# Patient Record
Sex: MALE | Race: WHITE | NOT HISPANIC OR LATINO
[De-identification: names, ages, dates, MRNs, and addresses within clinical notes are randomized per-mention and may not be internally consistent; named-entity substitution may affect disease eponyms.]

---

## 2017-02-21 PROBLEM — Z00.00 ENCOUNTER FOR PREVENTIVE HEALTH EXAMINATION: Status: ACTIVE | Noted: 2017-02-21

## 2017-03-13 ENCOUNTER — APPOINTMENT (OUTPATIENT)
Dept: ORTHOPEDIC SURGERY | Facility: CLINIC | Age: 57
End: 2017-03-13

## 2017-03-13 VITALS — BODY MASS INDEX: 35.76 KG/M2 | HEIGHT: 72 IN | WEIGHT: 264 LBS

## 2017-03-13 VITALS — HEART RATE: 69 BPM | DIASTOLIC BLOOD PRESSURE: 85 MMHG | SYSTOLIC BLOOD PRESSURE: 128 MMHG

## 2017-03-13 DIAGNOSIS — Z87.39 PERSONAL HISTORY OF OTHER DISEASES OF THE MUSCULOSKELETAL SYSTEM AND CONNECTIVE TISSUE: ICD-10-CM

## 2017-03-13 DIAGNOSIS — Z80.9 FAMILY HISTORY OF MALIGNANT NEOPLASM, UNSPECIFIED: ICD-10-CM

## 2017-03-13 DIAGNOSIS — Z78.9 OTHER SPECIFIED HEALTH STATUS: ICD-10-CM

## 2017-03-13 RX ORDER — ASCORBIC ACID 500 MG
500-400 TABLET,CHEWABLE ORAL
Refills: 0 | Status: ACTIVE | COMMUNITY

## 2017-03-13 RX ORDER — PSYLLIUM HUSK 0.4 G
CAPSULE ORAL
Refills: 0 | Status: ACTIVE | COMMUNITY

## 2017-03-14 ENCOUNTER — TRANSCRIPTION ENCOUNTER (OUTPATIENT)
Age: 57
End: 2017-03-14

## 2017-04-07 ENCOUNTER — OUTPATIENT (OUTPATIENT)
Dept: OUTPATIENT SERVICES | Facility: HOSPITAL | Age: 57
LOS: 1 days | End: 2017-04-07
Payer: COMMERCIAL

## 2017-04-07 VITALS
RESPIRATION RATE: 14 BRPM | HEART RATE: 75 BPM | HEIGHT: 72 IN | SYSTOLIC BLOOD PRESSURE: 144 MMHG | TEMPERATURE: 98 F | WEIGHT: 268.96 LBS | OXYGEN SATURATION: 96 % | DIASTOLIC BLOOD PRESSURE: 89 MMHG

## 2017-04-07 DIAGNOSIS — Z98.890 OTHER SPECIFIED POSTPROCEDURAL STATES: Chronic | ICD-10-CM

## 2017-04-07 DIAGNOSIS — Z01.818 ENCOUNTER FOR OTHER PREPROCEDURAL EXAMINATION: ICD-10-CM

## 2017-04-07 DIAGNOSIS — G47.33 OBSTRUCTIVE SLEEP APNEA (ADULT) (PEDIATRIC): ICD-10-CM

## 2017-04-07 DIAGNOSIS — M48.02 SPINAL STENOSIS, CERVICAL REGION: ICD-10-CM

## 2017-04-07 LAB
ANION GAP SERPL CALC-SCNC: 17 MMOL/L — SIGNIFICANT CHANGE UP (ref 5–17)
BLD GP AB SCN SERPL QL: NEGATIVE — SIGNIFICANT CHANGE UP
BUN SERPL-MCNC: 13 MG/DL — SIGNIFICANT CHANGE UP (ref 7–23)
CALCIUM SERPL-MCNC: 9.7 MG/DL — SIGNIFICANT CHANGE UP (ref 8.4–10.5)
CHLORIDE SERPL-SCNC: 100 MMOL/L — SIGNIFICANT CHANGE UP (ref 96–108)
CO2 SERPL-SCNC: 24 MMOL/L — SIGNIFICANT CHANGE UP (ref 22–31)
CREAT SERPL-MCNC: 1.05 MG/DL — SIGNIFICANT CHANGE UP (ref 0.5–1.3)
GLUCOSE SERPL-MCNC: 91 MG/DL — SIGNIFICANT CHANGE UP (ref 70–99)
HCT VFR BLD CALC: 45 % — SIGNIFICANT CHANGE UP (ref 39–50)
HGB BLD-MCNC: 15.2 G/DL — SIGNIFICANT CHANGE UP (ref 13–17)
MCHC RBC-ENTMCNC: 30.1 PG — SIGNIFICANT CHANGE UP (ref 27–34)
MCHC RBC-ENTMCNC: 33.8 GM/DL — SIGNIFICANT CHANGE UP (ref 32–36)
MCV RBC AUTO: 89.1 FL — SIGNIFICANT CHANGE UP (ref 80–100)
PLATELET # BLD AUTO: 206 K/UL — SIGNIFICANT CHANGE UP (ref 150–400)
POTASSIUM SERPL-MCNC: 4.2 MMOL/L — SIGNIFICANT CHANGE UP (ref 3.5–5.3)
POTASSIUM SERPL-SCNC: 4.2 MMOL/L — SIGNIFICANT CHANGE UP (ref 3.5–5.3)
RBC # BLD: 5.05 M/UL — SIGNIFICANT CHANGE UP (ref 4.2–5.8)
RBC # FLD: 12.9 % — SIGNIFICANT CHANGE UP (ref 10.3–14.5)
RH IG SCN BLD-IMP: POSITIVE — SIGNIFICANT CHANGE UP
SODIUM SERPL-SCNC: 141 MMOL/L — SIGNIFICANT CHANGE UP (ref 135–145)
WBC # BLD: 7.89 K/UL — SIGNIFICANT CHANGE UP (ref 3.8–10.5)
WBC # FLD AUTO: 7.89 K/UL — SIGNIFICANT CHANGE UP (ref 3.8–10.5)

## 2017-04-07 PROCEDURE — G0463: CPT

## 2017-04-07 PROCEDURE — 80048 BASIC METABOLIC PNL TOTAL CA: CPT

## 2017-04-07 PROCEDURE — 86900 BLOOD TYPING SEROLOGIC ABO: CPT

## 2017-04-07 PROCEDURE — 86901 BLOOD TYPING SEROLOGIC RH(D): CPT

## 2017-04-07 PROCEDURE — 86850 RBC ANTIBODY SCREEN: CPT

## 2017-04-07 PROCEDURE — 85027 COMPLETE CBC AUTOMATED: CPT

## 2017-04-07 RX ORDER — CEFAZOLIN SODIUM 1 G
2000 VIAL (EA) INJECTION ONCE
Qty: 0 | Refills: 0 | Status: DISCONTINUED | OUTPATIENT
Start: 2017-04-14 | End: 2017-04-15

## 2017-04-07 NOTE — H&P PST ADULT - ATTENDING COMMENTS
55 yo male with cervical HNP, spinal cord compression, failed conservative management and care, recommend ACDF C5-C7. I reviewed all major risks, benefits, and complications including bleeding, infection, neurological injury, spinal cord injury, implant failure, implant migration, pseudarthrosis, adjacent segment degeneration, dysphagia, dysphonia, horners syndrome, medical complications, and risk of mortality.

## 2017-04-07 NOTE — H&P PST ADULT - HISTORY OF PRESENT ILLNESS
57 yo male with h/o cervical spinal stenosis and cervical disc herniation with c/o worsening neck and bilateral arm pain (right worse than left), with intermittent paresthesias and loss of sensation in arms/hands. Pt is scheduled for C5-7 ACDF on 4/14/17.

## 2017-04-07 NOTE — H&P PST ADULT - PMH
Arthritis    Cervical herniated disc    Cluster headache    Spinal stenosis of cervical region Arthritis    Cervical herniated disc    Cluster headache    Obesity (BMI 30-39.9)    Spinal stenosis of cervical region

## 2017-04-07 NOTE — H&P PST ADULT - RS GEN PE MLT RESP DETAILS PC
none
clear to auscultation bilaterally/respirations non-labored/good air movement/breath sounds equal

## 2017-04-07 NOTE — H&P PST ADULT - NSANTHOSAYNRD_GEN_A_CORE
No. NIKA screening performed.  STOP BANG Legend: 0-2 = LOW Risk; 3-4 = INTERMEDIATE Risk; 5-8 = HIGH Risk No. NIKA screening performed.  STOP BANG Legend: 0-2 = LOW Risk; 3-4 = INTERMEDIATE Risk; 5-8 = HIGH Risk/neck = 15 inches

## 2017-04-07 NOTE — H&P PST ADULT - PSH
H/O cardiac catheterization  2006 - for c/o chest pain - no intervention  Status post debridement H/O cardiac catheterization  2006 - for c/o chest pain - no intervention  Status post debridement  right and left shoulders

## 2017-04-10 ENCOUNTER — RX RENEWAL (OUTPATIENT)
Age: 57
End: 2017-04-10

## 2017-04-13 ENCOUNTER — RESULT REVIEW (OUTPATIENT)
Age: 57
End: 2017-04-13

## 2017-04-14 ENCOUNTER — APPOINTMENT (OUTPATIENT)
Dept: ORTHOPEDIC SURGERY | Facility: HOSPITAL | Age: 57
End: 2017-04-14

## 2017-04-14 ENCOUNTER — INPATIENT (INPATIENT)
Facility: HOSPITAL | Age: 57
LOS: 0 days | Discharge: HOME | DRG: 473 | End: 2017-04-15
Attending: ORTHOPAEDIC SURGERY | Admitting: ORTHOPAEDIC SURGERY
Payer: COMMERCIAL

## 2017-04-14 ENCOUNTER — TRANSCRIPTION ENCOUNTER (OUTPATIENT)
Age: 57
End: 2017-04-14

## 2017-04-14 VITALS
OXYGEN SATURATION: 97 % | WEIGHT: 261.91 LBS | SYSTOLIC BLOOD PRESSURE: 144 MMHG | DIASTOLIC BLOOD PRESSURE: 90 MMHG | HEIGHT: 72 IN | RESPIRATION RATE: 18 BRPM | TEMPERATURE: 99 F | HEART RATE: 77 BPM

## 2017-04-14 DIAGNOSIS — Z98.890 OTHER SPECIFIED POSTPROCEDURAL STATES: Chronic | ICD-10-CM

## 2017-04-14 DIAGNOSIS — M48.02 SPINAL STENOSIS, CERVICAL REGION: ICD-10-CM

## 2017-04-14 LAB
ANION GAP SERPL CALC-SCNC: 12 MMOL/L — SIGNIFICANT CHANGE UP (ref 5–17)
BUN SERPL-MCNC: 16 MG/DL — SIGNIFICANT CHANGE UP (ref 7–23)
CALCIUM SERPL-MCNC: 8.6 MG/DL — SIGNIFICANT CHANGE UP (ref 8.4–10.5)
CHLORIDE SERPL-SCNC: 103 MMOL/L — SIGNIFICANT CHANGE UP (ref 96–108)
CO2 SERPL-SCNC: 23 MMOL/L — SIGNIFICANT CHANGE UP (ref 22–31)
CREAT SERPL-MCNC: 0.83 MG/DL — SIGNIFICANT CHANGE UP (ref 0.5–1.3)
GLUCOSE SERPL-MCNC: 164 MG/DL — HIGH (ref 70–99)
HCT VFR BLD CALC: 44 % — SIGNIFICANT CHANGE UP (ref 39–50)
HGB BLD-MCNC: 14.8 G/DL — SIGNIFICANT CHANGE UP (ref 13–17)
MCHC RBC-ENTMCNC: 30.7 PG — SIGNIFICANT CHANGE UP (ref 27–34)
MCHC RBC-ENTMCNC: 33.7 GM/DL — SIGNIFICANT CHANGE UP (ref 32–36)
MCV RBC AUTO: 90.9 FL — SIGNIFICANT CHANGE UP (ref 80–100)
PLATELET # BLD AUTO: 160 K/UL — SIGNIFICANT CHANGE UP (ref 150–400)
POTASSIUM SERPL-MCNC: 4 MMOL/L — SIGNIFICANT CHANGE UP (ref 3.5–5.3)
POTASSIUM SERPL-SCNC: 4 MMOL/L — SIGNIFICANT CHANGE UP (ref 3.5–5.3)
RBC # BLD: 4.84 M/UL — SIGNIFICANT CHANGE UP (ref 4.2–5.8)
RBC # FLD: 11.6 % — SIGNIFICANT CHANGE UP (ref 10.3–14.5)
RH IG SCN BLD-IMP: POSITIVE — SIGNIFICANT CHANGE UP
SODIUM SERPL-SCNC: 138 MMOL/L — SIGNIFICANT CHANGE UP (ref 135–145)
WBC # BLD: 9.5 K/UL — SIGNIFICANT CHANGE UP (ref 3.8–10.5)
WBC # FLD AUTO: 9.5 K/UL — SIGNIFICANT CHANGE UP (ref 3.8–10.5)

## 2017-04-14 PROCEDURE — 22552 ARTHRD ANT NTRBD CERVICAL EA: CPT

## 2017-04-14 PROCEDURE — 22859 INSJ BIOMECHANICAL DEVICE: CPT | Mod: 59

## 2017-04-14 PROCEDURE — 22845 INSERT SPINE FIXATION DEVICE: CPT

## 2017-04-14 PROCEDURE — 22551 ARTHRD ANT NTRBDY CERVICAL: CPT

## 2017-04-14 PROCEDURE — 88311 DECALCIFY TISSUE: CPT | Mod: 26

## 2017-04-14 PROCEDURE — 88304 TISSUE EXAM BY PATHOLOGIST: CPT | Mod: 26

## 2017-04-14 RX ORDER — DOCUSATE SODIUM 100 MG
100 CAPSULE ORAL THREE TIMES A DAY
Qty: 0 | Refills: 0 | Status: DISCONTINUED | OUTPATIENT
Start: 2017-04-14 | End: 2017-04-15

## 2017-04-14 RX ORDER — ONDANSETRON 8 MG/1
4 TABLET, FILM COATED ORAL EVERY 6 HOURS
Qty: 0 | Refills: 0 | Status: DISCONTINUED | OUTPATIENT
Start: 2017-04-14 | End: 2017-04-15

## 2017-04-14 RX ORDER — CYCLOBENZAPRINE HYDROCHLORIDE 10 MG/1
10 TABLET, FILM COATED ORAL EVERY 8 HOURS
Qty: 0 | Refills: 0 | Status: DISCONTINUED | OUTPATIENT
Start: 2017-04-14 | End: 2017-04-15

## 2017-04-14 RX ORDER — SODIUM CHLORIDE 9 MG/ML
3 INJECTION INTRAMUSCULAR; INTRAVENOUS; SUBCUTANEOUS EVERY 8 HOURS
Qty: 0 | Refills: 0 | Status: DISCONTINUED | OUTPATIENT
Start: 2017-04-14 | End: 2017-04-14

## 2017-04-14 RX ORDER — SENNA PLUS 8.6 MG/1
2 TABLET ORAL AT BEDTIME
Qty: 0 | Refills: 0 | Status: DISCONTINUED | OUTPATIENT
Start: 2017-04-14 | End: 2017-04-15

## 2017-04-14 RX ORDER — HYDROMORPHONE HYDROCHLORIDE 2 MG/ML
1 INJECTION INTRAMUSCULAR; INTRAVENOUS; SUBCUTANEOUS
Qty: 0 | Refills: 0 | Status: DISCONTINUED | OUTPATIENT
Start: 2017-04-14 | End: 2017-04-15

## 2017-04-14 RX ORDER — MAGNESIUM HYDROXIDE 400 MG/1
30 TABLET, CHEWABLE ORAL EVERY 12 HOURS
Qty: 0 | Refills: 0 | Status: DISCONTINUED | OUTPATIENT
Start: 2017-04-14 | End: 2017-04-15

## 2017-04-14 RX ORDER — CEFAZOLIN SODIUM 1 G
2000 VIAL (EA) INJECTION EVERY 8 HOURS
Qty: 0 | Refills: 0 | Status: COMPLETED | OUTPATIENT
Start: 2017-04-14 | End: 2017-04-15

## 2017-04-14 RX ORDER — ACETAMINOPHEN 500 MG
650 TABLET ORAL EVERY 6 HOURS
Qty: 0 | Refills: 0 | Status: DISCONTINUED | OUTPATIENT
Start: 2017-04-14 | End: 2017-04-15

## 2017-04-14 RX ORDER — SODIUM CHLORIDE 9 MG/ML
1000 INJECTION, SOLUTION INTRAVENOUS
Qty: 0 | Refills: 0 | Status: DISCONTINUED | OUTPATIENT
Start: 2017-04-14 | End: 2017-04-15

## 2017-04-14 RX ORDER — ONDANSETRON 8 MG/1
4 TABLET, FILM COATED ORAL ONCE
Qty: 0 | Refills: 0 | Status: DISCONTINUED | OUTPATIENT
Start: 2017-04-14 | End: 2017-04-14

## 2017-04-14 RX ORDER — HYDROMORPHONE HYDROCHLORIDE 2 MG/ML
0.5 INJECTION INTRAMUSCULAR; INTRAVENOUS; SUBCUTANEOUS
Qty: 0 | Refills: 0 | Status: DISCONTINUED | OUTPATIENT
Start: 2017-04-14 | End: 2017-04-14

## 2017-04-14 RX ADMIN — HYDROMORPHONE HYDROCHLORIDE 0.5 MILLIGRAM(S): 2 INJECTION INTRAMUSCULAR; INTRAVENOUS; SUBCUTANEOUS at 18:25

## 2017-04-14 RX ADMIN — Medication 100 MILLIGRAM(S): at 21:57

## 2017-04-14 RX ADMIN — SENNA PLUS 2 TABLET(S): 8.6 TABLET ORAL at 21:57

## 2017-04-14 RX ADMIN — CYCLOBENZAPRINE HYDROCHLORIDE 10 MILLIGRAM(S): 10 TABLET, FILM COATED ORAL at 21:57

## 2017-04-14 RX ADMIN — SODIUM CHLORIDE 100 MILLILITER(S): 9 INJECTION, SOLUTION INTRAVENOUS at 17:03

## 2017-04-14 NOTE — PATIENT PROFILE ADULT. - PSH
H/O cardiac catheterization  2006 - for c/o chest pain - no intervention  Status post debridement  right and left shoulders

## 2017-04-14 NOTE — PATIENT PROFILE ADULT. - PMH
Arthritis    Cervical herniated disc    Cluster headache    Obesity (BMI 30-39.9)    Spinal stenosis of cervical region

## 2017-04-15 ENCOUNTER — TRANSCRIPTION ENCOUNTER (OUTPATIENT)
Age: 57
End: 2017-04-15

## 2017-04-15 VITALS — HEART RATE: 84 BPM | OXYGEN SATURATION: 96 % | DIASTOLIC BLOOD PRESSURE: 82 MMHG | SYSTOLIC BLOOD PRESSURE: 145 MMHG

## 2017-04-15 LAB
ANION GAP SERPL CALC-SCNC: 16 MMOL/L — SIGNIFICANT CHANGE UP (ref 5–17)
BUN SERPL-MCNC: 13 MG/DL — SIGNIFICANT CHANGE UP (ref 7–23)
CALCIUM SERPL-MCNC: 8.9 MG/DL — SIGNIFICANT CHANGE UP (ref 8.4–10.5)
CHLORIDE SERPL-SCNC: 103 MMOL/L — SIGNIFICANT CHANGE UP (ref 96–108)
CO2 SERPL-SCNC: 18 MMOL/L — LOW (ref 22–31)
CREAT SERPL-MCNC: 0.81 MG/DL — SIGNIFICANT CHANGE UP (ref 0.5–1.3)
GLUCOSE SERPL-MCNC: 143 MG/DL — HIGH (ref 70–99)
HCT VFR BLD CALC: 41.6 % — SIGNIFICANT CHANGE UP (ref 39–50)
HGB BLD-MCNC: 14.2 G/DL — SIGNIFICANT CHANGE UP (ref 13–17)
MCHC RBC-ENTMCNC: 30.9 PG — SIGNIFICANT CHANGE UP (ref 27–34)
MCHC RBC-ENTMCNC: 34.1 GM/DL — SIGNIFICANT CHANGE UP (ref 32–36)
MCV RBC AUTO: 90.4 FL — SIGNIFICANT CHANGE UP (ref 80–100)
PLATELET # BLD AUTO: 189 K/UL — SIGNIFICANT CHANGE UP (ref 150–400)
POTASSIUM SERPL-MCNC: 4.2 MMOL/L — SIGNIFICANT CHANGE UP (ref 3.5–5.3)
POTASSIUM SERPL-SCNC: 4.2 MMOL/L — SIGNIFICANT CHANGE UP (ref 3.5–5.3)
RBC # BLD: 4.6 M/UL — SIGNIFICANT CHANGE UP (ref 4.2–5.8)
RBC # FLD: 12.8 % — SIGNIFICANT CHANGE UP (ref 10.3–14.5)
SODIUM SERPL-SCNC: 137 MMOL/L — SIGNIFICANT CHANGE UP (ref 135–145)
WBC # BLD: 13.1 K/UL — HIGH (ref 3.8–10.5)
WBC # FLD AUTO: 13.1 K/UL — HIGH (ref 3.8–10.5)

## 2017-04-15 PROCEDURE — 88304 TISSUE EXAM BY PATHOLOGIST: CPT

## 2017-04-15 PROCEDURE — 85027 COMPLETE CBC AUTOMATED: CPT

## 2017-04-15 PROCEDURE — 88311 DECALCIFY TISSUE: CPT

## 2017-04-15 PROCEDURE — 76000 FLUOROSCOPY <1 HR PHYS/QHP: CPT

## 2017-04-15 PROCEDURE — 80048 BASIC METABOLIC PNL TOTAL CA: CPT

## 2017-04-15 PROCEDURE — 86901 BLOOD TYPING SEROLOGIC RH(D): CPT

## 2017-04-15 PROCEDURE — C1889: CPT

## 2017-04-15 PROCEDURE — C1713: CPT

## 2017-04-15 PROCEDURE — 97161 PT EVAL LOW COMPLEX 20 MIN: CPT

## 2017-04-15 PROCEDURE — 86900 BLOOD TYPING SEROLOGIC ABO: CPT

## 2017-04-15 RX ORDER — CYCLOBENZAPRINE HYDROCHLORIDE 10 MG/1
1 TABLET, FILM COATED ORAL
Qty: 20 | Refills: 0 | OUTPATIENT
Start: 2017-04-15

## 2017-04-15 RX ORDER — OXYCODONE HYDROCHLORIDE 5 MG/1
10 TABLET ORAL
Qty: 0 | Refills: 0 | Status: DISCONTINUED | OUTPATIENT
Start: 2017-04-15 | End: 2017-04-15

## 2017-04-15 RX ORDER — SENNA PLUS 8.6 MG/1
2 TABLET ORAL
Qty: 0 | Refills: 0 | COMMUNITY
Start: 2017-04-15

## 2017-04-15 RX ORDER — BENZOCAINE AND MENTHOL 5; 1 G/100ML; G/100ML
1 LIQUID ORAL DAILY
Qty: 0 | Refills: 0 | Status: DISCONTINUED | OUTPATIENT
Start: 2017-04-15 | End: 2017-04-15

## 2017-04-15 RX ORDER — GLUCOSAMINE HCL/CHONDROITIN SU 500-400 MG
1 CAPSULE ORAL
Qty: 0 | Refills: 0 | COMMUNITY

## 2017-04-15 RX ORDER — IBUPROFEN 200 MG
1 TABLET ORAL
Qty: 0 | Refills: 0 | COMMUNITY

## 2017-04-15 RX ORDER — OXYCODONE HYDROCHLORIDE 5 MG/1
5 TABLET ORAL
Qty: 0 | Refills: 0 | Status: DISCONTINUED | OUTPATIENT
Start: 2017-04-15 | End: 2017-04-15

## 2017-04-15 RX ADMIN — OXYCODONE HYDROCHLORIDE 10 MILLIGRAM(S): 5 TABLET ORAL at 09:55

## 2017-04-15 RX ADMIN — Medication 100 MILLIGRAM(S): at 07:03

## 2017-04-15 RX ADMIN — Medication 100 MILLIGRAM(S): at 13:01

## 2017-04-15 RX ADMIN — CYCLOBENZAPRINE HYDROCHLORIDE 10 MILLIGRAM(S): 10 TABLET, FILM COATED ORAL at 07:04

## 2017-04-15 RX ADMIN — HYDROMORPHONE HYDROCHLORIDE 1 MILLIGRAM(S): 2 INJECTION INTRAMUSCULAR; INTRAVENOUS; SUBCUTANEOUS at 01:00

## 2017-04-15 RX ADMIN — CYCLOBENZAPRINE HYDROCHLORIDE 10 MILLIGRAM(S): 10 TABLET, FILM COATED ORAL at 13:01

## 2017-04-15 RX ADMIN — Medication 100 MILLIGRAM(S): at 07:04

## 2017-04-15 RX ADMIN — HYDROMORPHONE HYDROCHLORIDE 1 MILLIGRAM(S): 2 INJECTION INTRAMUSCULAR; INTRAVENOUS; SUBCUTANEOUS at 00:44

## 2017-04-15 RX ADMIN — HYDROMORPHONE HYDROCHLORIDE 1 MILLIGRAM(S): 2 INJECTION INTRAMUSCULAR; INTRAVENOUS; SUBCUTANEOUS at 07:05

## 2017-04-15 RX ADMIN — HYDROMORPHONE HYDROCHLORIDE 1 MILLIGRAM(S): 2 INJECTION INTRAMUSCULAR; INTRAVENOUS; SUBCUTANEOUS at 07:35

## 2017-04-15 RX ADMIN — OXYCODONE HYDROCHLORIDE 10 MILLIGRAM(S): 5 TABLET ORAL at 10:30

## 2017-04-15 NOTE — DISCHARGE NOTE ADULT - MEDICATION SUMMARY - MEDICATIONS TO TAKE
I will START or STAY ON the medications listed below when I get home from the hospital:    senna oral tablet  -- 2 tab(s) by mouth once a day (at bedtime)  -- Indication: For laxative    cyclobenzaprine 10 mg oral tablet  -- 1 tab(s) by mouth every 8 hours, As Needed for muscle spasms MDD:3  -- Indication: For muscle spasms

## 2017-04-15 NOTE — DISCHARGE NOTE ADULT - CARE PROVIDERS DIRECT ADDRESSES
[email or web address]

## 2017-04-15 NOTE — DISCHARGE NOTE ADULT - CARE PROVIDER_API CALL
José Luis Johnson (MD), Orthopedics  [address] [place] [address]  Phone: [phone]  Fax: [phone]

## 2017-04-15 NOTE — DISCHARGE NOTE ADULT - HOSPITAL COURSE
History of Present Illness		  55 yo male with h/o cervical spinal stenosis and cervical disc herniation with c/o worsening neck and bilateral arm pain (right worse than left), with intermittent paresthesias and loss of sensation in arms/hands. Pt is scheduled for C5-7 ACDF on 4/14/17.    Admitted for elective surgery on 4/14/17.  S/P C5 -C7 anterior descectomy and fusion.  Patient tolerated procedure well.  Physical therapy for ambulation.  PT cleared patient and recommended outpatient therapy.  Will d//c patient when pain issues are resolved. History of Present Illness		  55 yo male with h/o cervical spinal stenosis and cervical disc herniation with c/o worsening neck and bilateral arm pain (right worse than left), with intermittent paresthesias and loss of sensation in arms/hands. Pt is scheduled for C5-7 ACDF on 4/14/17.    Admitted for elective surgery on 4/14/17.  S/P C5 -C7 anterior descectomy and fusion.  Patient tolerated procedure well.  Physical therapy for ambulation.  PT cleared patient and recommended outpatient therapy.  Will d//c patient when pain issues are resolved.  Will d/c patient on flexeril prn and patient has narco at home for pain issues.

## 2017-04-15 NOTE — DISCHARGE NOTE ADULT - PLAN OF CARE
reduce pain.  improve function upper extemities See  Dr Johnson's instructions.  F/U with Dr Johnson within 7 - 10 days.

## 2017-04-15 NOTE — PHYSICAL THERAPY INITIAL EVALUATION ADULT - ACTIVE RANGE OF MOTION EXAMINATION, REHAB EVAL
Right UE Active ROM was WFL (within functional limits)/Left LE Active ROM was WFL (within functional limits)/Left UE Active ROM was WFL (within functional limits)/Right LE Active ROM was WFL (within functional limits)

## 2017-04-15 NOTE — DISCHARGE NOTE ADULT - NS AS ACTIVITY OBS
Walking-Indoors allowed/Stairs allowed/Walking-Outdoors allowed/Do not make important decisions/Do not drive or operate machinery/No Heavy lifting/straining

## 2017-04-15 NOTE — PHYSICAL THERAPY INITIAL EVALUATION ADULT - PLANNED THERAPY INTERVENTIONS, PT EVAL
Patient is independent with all functional mobility. Patient will be d/c from PT services at this time.

## 2017-04-15 NOTE — PHYSICAL THERAPY INITIAL EVALUATION ADULT - PERTINENT HX OF CURRENT PROBLEM, REHAB EVAL
Patient is a 56 yr old M with h/o cervical spinal stenosis and cervical disc herniation who presents with c/o worsening neck and bilateral arm pain (right worse than left), with intermittent paresthesias and loss of sensation in arms/hands. Patient now here for surgery, s/p above procedure on 4/14.

## 2017-04-15 NOTE — DISCHARGE NOTE ADULT - CARE PLAN
Principal Discharge DX:	Cervical herniated disc  Goal:	reduce pain.  improve function upper extemities  Instructions for follow-up, activity and diet:	See  Dr Johnson's instructions.  F/U with Dr Johnson within 7 - 10 days.

## 2017-04-15 NOTE — PHYSICAL THERAPY INITIAL EVALUATION ADULT - ADDITIONAL COMMENTS
Patient resides in a private home with family. 5 steps to enter, 12 inside, however patient can stay on first floor if need. Prior to admission patient was independent with ambulation and all functional activities.

## 2017-04-15 NOTE — DISCHARGE NOTE ADULT - PATIENT PORTAL LINK FT
“You can access the [facility] Patient Portal, offered by [facility], by registering with the following website: [email or web address]”

## 2017-04-15 NOTE — DISCHARGE NOTE ADULT - MEDICATION SUMMARY - MEDICATIONS TO STOP TAKING
I will STOP taking the medications listed below when I get home from the hospital:    Advil 200 mg oral tablet  -- 1 tab(s) by mouth 4-5 times daily - hold to OR    chondroitin-glucosamine  -- 1 tab(s) by mouth 2 times a day - hold to OR

## 2017-04-17 ENCOUNTER — APPOINTMENT (OUTPATIENT)
Dept: ORTHOPEDIC SURGERY | Facility: CLINIC | Age: 57
End: 2017-04-17

## 2017-04-17 RX ORDER — AZITHROMYCIN 250 MG/1
250 TABLET, FILM COATED ORAL
Qty: 6 | Refills: 0 | Status: ACTIVE | COMMUNITY
Start: 2017-01-25

## 2017-04-17 RX ORDER — FLUTICASONE PROPIONATE 50 UG/1
50 SPRAY, METERED NASAL
Qty: 16 | Refills: 0 | Status: ACTIVE | COMMUNITY
Start: 2017-01-25

## 2017-04-17 RX ORDER — PREDNISONE 10 MG/1
10 TABLET ORAL
Qty: 30 | Refills: 0 | Status: ACTIVE | COMMUNITY
Start: 2017-04-17 | End: 1900-01-01

## 2017-05-01 ENCOUNTER — APPOINTMENT (OUTPATIENT)
Dept: ORTHOPEDIC SURGERY | Facility: CLINIC | Age: 57
End: 2017-05-01

## 2017-05-01 RX ORDER — CYCLOBENZAPRINE HYDROCHLORIDE 5 MG/1
5 TABLET, FILM COATED ORAL
Qty: 90 | Refills: 0 | Status: ACTIVE | COMMUNITY
Start: 2017-05-01 | End: 1900-01-01

## 2017-05-04 ENCOUNTER — TRANSCRIPTION ENCOUNTER (OUTPATIENT)
Age: 57
End: 2017-05-04

## 2017-06-05 ENCOUNTER — APPOINTMENT (OUTPATIENT)
Dept: ORTHOPEDIC SURGERY | Facility: CLINIC | Age: 57
End: 2017-06-05

## 2017-06-05 DIAGNOSIS — M48.02 SPINAL STENOSIS, CERVICAL REGION: ICD-10-CM

## 2017-06-05 RX ORDER — PREDNISONE 10 MG/1
10 TABLET ORAL
Qty: 30 | Refills: 0 | Status: ACTIVE | COMMUNITY
Start: 2017-06-05 | End: 1900-01-01

## 2017-06-12 ENCOUNTER — RX RENEWAL (OUTPATIENT)
Age: 57
End: 2017-06-12

## 2017-06-12 RX ORDER — HYDROCODONE BITARTRATE AND ACETAMINOPHEN 5; 325 MG/1; MG/1
5-325 TABLET ORAL
Qty: 120 | Refills: 0 | Status: ACTIVE | COMMUNITY
Start: 2017-05-01 | End: 1900-01-01

## 2017-06-19 ENCOUNTER — APPOINTMENT (OUTPATIENT)
Dept: ORTHOPEDIC SURGERY | Facility: CLINIC | Age: 57
End: 2017-06-19

## 2017-07-10 ENCOUNTER — APPOINTMENT (OUTPATIENT)
Dept: ORTHOPEDIC SURGERY | Facility: CLINIC | Age: 57
End: 2017-07-10

## 2017-07-10 RX ORDER — HYDROCODONE BITARTRATE AND ACETAMINOPHEN 5; 325 MG/1; MG/1
5-325 TABLET ORAL
Qty: 60 | Refills: 0 | Status: ACTIVE | COMMUNITY
Start: 2017-04-10 | End: 1900-01-01

## 2017-07-21 ENCOUNTER — TRANSCRIPTION ENCOUNTER (OUTPATIENT)
Age: 57
End: 2017-07-21

## 2017-07-24 ENCOUNTER — MEDICATION RENEWAL (OUTPATIENT)
Age: 57
End: 2017-07-24

## 2017-07-24 RX ORDER — CYCLOBENZAPRINE HYDROCHLORIDE 5 MG/1
5 TABLET, FILM COATED ORAL 3 TIMES DAILY
Qty: 90 | Refills: 0 | Status: ACTIVE | COMMUNITY
Start: 2017-07-24 | End: 1900-01-01

## 2017-08-14 ENCOUNTER — APPOINTMENT (OUTPATIENT)
Dept: ORTHOPEDIC SURGERY | Facility: CLINIC | Age: 57
End: 2017-08-14
Payer: COMMERCIAL

## 2017-08-14 PROCEDURE — 99214 OFFICE O/P EST MOD 30 MIN: CPT

## 2017-08-14 PROCEDURE — 72040 X-RAY EXAM NECK SPINE 2-3 VW: CPT

## 2017-08-14 RX ORDER — SULFAMETHOXAZOLE AND TRIMETHOPRIM 800; 160 MG/1; MG/1
800-160 TABLET ORAL
Qty: 20 | Refills: 0 | Status: ACTIVE | COMMUNITY
Start: 2017-08-02

## 2017-08-14 RX ORDER — CLOSTRIDIUM TETANI TOXOID ANTIGEN (FORMALDEHYDE INACTIVATED), CORYNEBACTERIUM DIPHTHERIAE TOXOID ANTIGEN (FORMALDEHYDE INACTIVATED), BORDETELLA PERTUSSIS TOXOID ANTIGEN (GLUTARALDEHYDE INACTIVATED), BORDETELLA PERTUSSIS FILAMENTOUS HEMAGGLUTININ ANTIGEN (FORMALDEHYDE INACTIVATED), BORDETELLA PERTUSSIS PERTACTIN ANTIGEN, AND BORDETELLA PERTUSSIS FIMBRIAE 2/3 ANTIGEN 5; 2; 2.5; 5; 3; 5 [LF]/.5ML; [LF]/.5ML; UG/.5ML; UG/.5ML; UG/.5ML; UG/.5ML
5-2-15.5 INJECTION, SUSPENSION INTRAMUSCULAR
Qty: 1 | Refills: 0 | Status: ACTIVE | COMMUNITY
Start: 2017-08-02

## 2017-08-14 RX ORDER — MUPIROCIN 2 G/100G
2 CREAM TOPICAL
Qty: 30 | Refills: 0 | Status: ACTIVE | COMMUNITY
Start: 2017-08-02

## 2017-08-31 ENCOUNTER — TRANSCRIPTION ENCOUNTER (OUTPATIENT)
Age: 57
End: 2017-08-31

## 2017-09-07 ENCOUNTER — RX RENEWAL (OUTPATIENT)
Age: 57
End: 2017-09-07

## 2017-09-11 ENCOUNTER — APPOINTMENT (OUTPATIENT)
Dept: ORTHOPEDIC SURGERY | Facility: CLINIC | Age: 57
End: 2017-09-11
Payer: COMMERCIAL

## 2017-09-11 PROCEDURE — 72040 X-RAY EXAM NECK SPINE 2-3 VW: CPT

## 2017-09-11 PROCEDURE — 99214 OFFICE O/P EST MOD 30 MIN: CPT

## 2017-09-11 RX ORDER — DICLOFENAC SODIUM 75 MG/1
75 TABLET, DELAYED RELEASE ORAL
Qty: 60 | Refills: 1 | Status: ACTIVE | COMMUNITY
Start: 2017-08-14 | End: 1900-01-01

## 2017-10-16 ENCOUNTER — APPOINTMENT (OUTPATIENT)
Dept: PULMONOLOGY | Facility: CLINIC | Age: 57
End: 2017-10-16
Payer: COMMERCIAL

## 2017-10-16 VITALS
DIASTOLIC BLOOD PRESSURE: 87 MMHG | SYSTOLIC BLOOD PRESSURE: 127 MMHG | WEIGHT: 258 LBS | TEMPERATURE: 98.5 F | HEIGHT: 72 IN | RESPIRATION RATE: 17 BRPM | BODY MASS INDEX: 34.95 KG/M2 | HEART RATE: 71 BPM | OXYGEN SATURATION: 98 %

## 2017-10-16 DIAGNOSIS — N43.40 SPERMATOCELE OF EPIDIDYMIS, UNSPECIFIED: ICD-10-CM

## 2017-10-16 LAB
BASE EXCESS BLDA CALC-SCNC: -0.9
BLOOD GAS COMMENTS ARTERIAL: NORMAL
GAS PNL BLDA: NORMAL
HCO3 BLDA-SCNC: 22.9
HOROWITZ INDEX BLDA+IHG-RTO: NORMAL
PCO2 BLDA: 35.5
PH BLDA: 7.43
PO2 BLDA: 71
SAO2 % BLDA: 94.5

## 2017-10-16 PROCEDURE — 82803 BLOOD GASES ANY COMBINATION: CPT

## 2017-10-16 PROCEDURE — 36600 WITHDRAWAL OF ARTERIAL BLOOD: CPT | Mod: 59

## 2017-10-16 PROCEDURE — 99204 OFFICE O/P NEW MOD 45 MIN: CPT | Mod: 25

## 2017-10-16 PROCEDURE — 94060 EVALUATION OF WHEEZING: CPT

## 2017-10-16 PROCEDURE — 94664 DEMO&/EVAL PT USE INHALER: CPT | Mod: 59

## 2017-10-16 PROCEDURE — 94729 DIFFUSING CAPACITY: CPT

## 2017-10-16 PROCEDURE — 94727 GAS DIL/WSHOT DETER LNG VOL: CPT

## 2017-10-16 RX ORDER — AMOXICILLIN AND CLAVULANATE POTASSIUM 875; 125 MG/1; MG/1
875-125 TABLET, COATED ORAL
Qty: 20 | Refills: 1 | Status: ACTIVE | COMMUNITY
Start: 2017-10-16 | End: 1900-01-01

## 2017-10-16 RX ORDER — LEVOFLOXACIN 750 MG/1
750 TABLET, FILM COATED ORAL DAILY
Qty: 10 | Refills: 1 | Status: ACTIVE | COMMUNITY
Start: 2017-10-16 | End: 1900-01-01

## 2017-10-16 RX ORDER — METHYLPREDNISOLONE 2 MG/1
2 TABLET ORAL
Qty: 14 | Refills: 1 | Status: ACTIVE | COMMUNITY
Start: 2017-10-16 | End: 1900-01-01

## 2017-10-30 ENCOUNTER — APPOINTMENT (OUTPATIENT)
Dept: PULMONOLOGY | Facility: CLINIC | Age: 57
End: 2017-10-30
Payer: COMMERCIAL

## 2017-10-30 ENCOUNTER — LABORATORY RESULT (OUTPATIENT)
Age: 57
End: 2017-10-30

## 2017-10-30 VITALS
WEIGHT: 262 LBS | SYSTOLIC BLOOD PRESSURE: 130 MMHG | BODY MASS INDEX: 35.49 KG/M2 | DIASTOLIC BLOOD PRESSURE: 86 MMHG | RESPIRATION RATE: 16 BRPM | TEMPERATURE: 98.4 F | HEART RATE: 68 BPM | HEIGHT: 72 IN | OXYGEN SATURATION: 9 %

## 2017-10-30 DIAGNOSIS — R63.4 ABNORMAL WEIGHT LOSS: ICD-10-CM

## 2017-10-30 DIAGNOSIS — R04.2 HEMOPTYSIS: ICD-10-CM

## 2017-10-30 LAB
BASE EXCESS BLDA CALC-SCNC: -0.4
BLOOD GAS COMMENTS ARTERIAL: NORMAL
GAS PNL BLDA: NORMAL
HCO3 BLDA-SCNC: NORMAL
HOROWITZ INDEX BLDA+IHG-RTO: NORMAL
PCO2 BLDA: 35.7
PH BLDA: 7.43
PO2 BLDA: 73
SAO2 % BLDA: NORMAL

## 2017-10-30 PROCEDURE — 99214 OFFICE O/P EST MOD 30 MIN: CPT | Mod: 25

## 2017-10-30 PROCEDURE — 82803 BLOOD GASES ANY COMBINATION: CPT

## 2017-10-30 PROCEDURE — 36600 WITHDRAWAL OF ARTERIAL BLOOD: CPT | Mod: 59

## 2017-10-30 RX ORDER — PANTOPRAZOLE 40 MG/1
40 TABLET, DELAYED RELEASE ORAL
Qty: 30 | Refills: 3 | Status: ACTIVE | COMMUNITY
Start: 2017-10-30 | End: 1900-01-01

## 2017-11-20 ENCOUNTER — APPOINTMENT (OUTPATIENT)
Dept: PULMONOLOGY | Facility: CLINIC | Age: 57
End: 2017-11-20
Payer: COMMERCIAL

## 2017-11-20 VITALS
WEIGHT: 265 LBS | BODY MASS INDEX: 35.89 KG/M2 | SYSTOLIC BLOOD PRESSURE: 137 MMHG | DIASTOLIC BLOOD PRESSURE: 91 MMHG | HEIGHT: 72 IN | RESPIRATION RATE: 19 BRPM | TEMPERATURE: 98.3 F | HEART RATE: 76 BPM | OXYGEN SATURATION: 96 %

## 2017-11-20 DIAGNOSIS — J18.9 PNEUMONIA, UNSPECIFIED ORGANISM: ICD-10-CM

## 2017-11-20 DIAGNOSIS — R06.2 WHEEZING: ICD-10-CM

## 2017-11-20 LAB
BASE EXCESS BLDA CALC-SCNC: -1.3
BLOOD GAS COMMENTS ARTERIAL: NORMAL
GAS PNL BLDA: NORMAL
HCO3 BLDA-SCNC: 22.2
HOROWITZ INDEX BLDA+IHG-RTO: NORMAL
PCO2 BLDA: 32.8
PH BLDA: 7.45
PO2 BLDA: 69
SAO2 % BLDA: NORMAL

## 2017-11-20 PROCEDURE — 36600 WITHDRAWAL OF ARTERIAL BLOOD: CPT | Mod: 59

## 2017-11-20 PROCEDURE — 99214 OFFICE O/P EST MOD 30 MIN: CPT | Mod: 25

## 2017-11-20 PROCEDURE — 94664 DEMO&/EVAL PT USE INHALER: CPT | Mod: 59

## 2017-11-20 PROCEDURE — 82803 BLOOD GASES ANY COMBINATION: CPT

## 2017-11-20 RX ORDER — METHYLPREDNISOLONE 2 MG/1
2 TABLET ORAL
Qty: 40 | Refills: 1 | Status: ACTIVE | COMMUNITY
Start: 2017-11-20 | End: 1900-01-01

## 2017-11-20 RX ORDER — BUDESONIDE AND FORMOTEROL FUMARATE DIHYDRATE 160; 4.5 UG/1; UG/1
160-4.5 AEROSOL RESPIRATORY (INHALATION) TWICE DAILY
Qty: 1 | Refills: 3 | Status: ACTIVE | COMMUNITY
Start: 2017-11-20 | End: 1900-01-01

## 2017-11-28 ENCOUNTER — RX RENEWAL (OUTPATIENT)
Age: 57
End: 2017-11-28

## 2017-12-04 ENCOUNTER — APPOINTMENT (OUTPATIENT)
Dept: PULMONOLOGY | Facility: CLINIC | Age: 57
End: 2017-12-04
Payer: COMMERCIAL

## 2017-12-04 VITALS
BODY MASS INDEX: 35.76 KG/M2 | HEART RATE: 68 BPM | WEIGHT: 264 LBS | OXYGEN SATURATION: 96 % | HEIGHT: 72 IN | DIASTOLIC BLOOD PRESSURE: 85 MMHG | RESPIRATION RATE: 19 BRPM | SYSTOLIC BLOOD PRESSURE: 138 MMHG | TEMPERATURE: 98.4 F

## 2017-12-04 DIAGNOSIS — R09.02 HYPOXEMIA: ICD-10-CM

## 2017-12-04 LAB
BASE EXCESS BLDA CALC-SCNC: -2.9
BLOOD GAS COMMENTS ARTERIAL: NORMAL
GAS PNL BLDA: NORMAL
HCO3 BLDA-SCNC: 20.5
HOROWITZ INDEX BLDA+IHG-RTO: NORMAL
PCO2 BLDA: 29.8
PH BLDA: 7.45
PO2 BLDA: 97
SAO2 % BLDA: NORMAL

## 2017-12-04 PROCEDURE — 36600 WITHDRAWAL OF ARTERIAL BLOOD: CPT | Mod: 59

## 2017-12-04 PROCEDURE — 99214 OFFICE O/P EST MOD 30 MIN: CPT | Mod: 25

## 2017-12-04 PROCEDURE — 82803 BLOOD GASES ANY COMBINATION: CPT

## 2017-12-04 PROCEDURE — 94664 DEMO&/EVAL PT USE INHALER: CPT | Mod: 59

## 2017-12-04 RX ORDER — FLUTICASONE FUROATE AND VILANTEROL TRIFENATATE 200; 25 UG/1; UG/1
200-25 POWDER RESPIRATORY (INHALATION)
Qty: 1 | Refills: 3 | Status: ACTIVE | COMMUNITY
Start: 2017-10-30 | End: 1900-01-01

## 2017-12-05 RX ORDER — TIOTROPIUM BROMIDE 18 UG/1
18 CAPSULE ORAL; RESPIRATORY (INHALATION) DAILY
Qty: 30 | Refills: 3 | Status: ACTIVE | COMMUNITY
Start: 2017-12-05 | End: 1900-01-01

## 2017-12-08 ENCOUNTER — RX RENEWAL (OUTPATIENT)
Age: 57
End: 2017-12-08

## 2017-12-11 ENCOUNTER — APPOINTMENT (OUTPATIENT)
Dept: ORTHOPEDIC SURGERY | Facility: CLINIC | Age: 57
End: 2017-12-11
Payer: COMMERCIAL

## 2017-12-11 DIAGNOSIS — M50.20 OTHER CERVICAL DISC DISPLACEMENT, UNSPECIFIED CERVICAL REGION: ICD-10-CM

## 2017-12-11 PROCEDURE — 99214 OFFICE O/P EST MOD 30 MIN: CPT

## 2017-12-11 PROCEDURE — 72040 X-RAY EXAM NECK SPINE 2-3 VW: CPT

## 2017-12-19 ENCOUNTER — APPOINTMENT (OUTPATIENT)
Dept: PULMONOLOGY | Facility: CLINIC | Age: 57
End: 2017-12-19
Payer: COMMERCIAL

## 2017-12-19 VITALS
HEART RATE: 69 BPM | WEIGHT: 264 LBS | RESPIRATION RATE: 18 BRPM | DIASTOLIC BLOOD PRESSURE: 83 MMHG | OXYGEN SATURATION: 97 % | HEIGHT: 72 IN | SYSTOLIC BLOOD PRESSURE: 125 MMHG | BODY MASS INDEX: 35.76 KG/M2 | TEMPERATURE: 98.6 F

## 2017-12-19 DIAGNOSIS — J45.20 MILD INTERMITTENT ASTHMA, UNCOMPLICATED: ICD-10-CM

## 2017-12-19 DIAGNOSIS — Z87.09 PERSONAL HISTORY OF OTHER DISEASES OF THE RESPIRATORY SYSTEM: ICD-10-CM

## 2017-12-19 DIAGNOSIS — R14.0 ABDOMINAL DISTENSION (GASEOUS): ICD-10-CM

## 2017-12-19 DIAGNOSIS — R91.8 OTHER NONSPECIFIC ABNORMAL FINDING OF LUNG FIELD: ICD-10-CM

## 2017-12-19 PROCEDURE — 90688 IIV4 VACCINE SPLT 0.5 ML IM: CPT

## 2017-12-19 PROCEDURE — 99214 OFFICE O/P EST MOD 30 MIN: CPT | Mod: 25

## 2017-12-19 PROCEDURE — G0008: CPT

## 2018-04-16 ENCOUNTER — APPOINTMENT (OUTPATIENT)
Dept: ORTHOPEDIC SURGERY | Facility: CLINIC | Age: 58
End: 2018-04-16
Payer: COMMERCIAL

## 2018-04-16 DIAGNOSIS — G56.80 OTHER SPECIFIED MONONEUROPATHIES OF UNSPECIFIED UPPER LIMB: ICD-10-CM

## 2018-04-16 PROCEDURE — 72040 X-RAY EXAM NECK SPINE 2-3 VW: CPT

## 2018-04-16 PROCEDURE — 99214 OFFICE O/P EST MOD 30 MIN: CPT

## 2018-12-28 NOTE — DISCHARGE NOTE ADULT - FUNCTIONAL SCREEN CURRENT LEVEL: AMBULATION, MLM
Called the pt, let her know test was not performed, she will come in on 01/02/2019.   
No results on file, pt had second breath ozzie test done on 12/19/2018, will call ACL.  
Patient called again and wanted results from the test she had done for her stomach.  Please call the patient at [phone].  
Pt would like to go over test results    Pt at [phone]  
(3) assistive equipment and person

## 2020-06-29 ENCOUNTER — TRANSCRIPTION ENCOUNTER (OUTPATIENT)
Age: 60
End: 2020-06-29

## 2020-06-29 PROBLEM — M19.90 UNSPECIFIED OSTEOARTHRITIS, UNSPECIFIED SITE: Chronic | Status: ACTIVE | Noted: 2017-04-07

## 2020-06-29 PROBLEM — M48.02 SPINAL STENOSIS, CERVICAL REGION: Chronic | Status: ACTIVE | Noted: 2017-04-07

## 2020-06-29 PROBLEM — M50.20 OTHER CERVICAL DISC DISPLACEMENT, UNSPECIFIED CERVICAL REGION: Chronic | Status: ACTIVE | Noted: 2017-04-07

## 2020-06-29 PROBLEM — E66.9 OBESITY, UNSPECIFIED: Chronic | Status: ACTIVE | Noted: 2017-04-07

## 2020-06-29 PROBLEM — G44.009 CLUSTER HEADACHE SYNDROME, UNSPECIFIED, NOT INTRACTABLE: Chronic | Status: ACTIVE | Noted: 2017-04-07

## 2020-07-13 ENCOUNTER — APPOINTMENT (OUTPATIENT)
Dept: ORTHOPEDIC SURGERY | Facility: CLINIC | Age: 60
End: 2020-07-13
Payer: COMMERCIAL

## 2020-07-13 VITALS — TEMPERATURE: 97.9 F

## 2020-07-13 DIAGNOSIS — M54.12 RADICULOPATHY, CERVICAL REGION: ICD-10-CM

## 2020-07-13 DIAGNOSIS — M47.812 SPONDYLOSIS W/OUT MYELOPATHY OR RADICULOPATHY, CERVICAL REGION: ICD-10-CM

## 2020-07-13 PROCEDURE — 72040 X-RAY EXAM NECK SPINE 2-3 VW: CPT

## 2020-07-13 PROCEDURE — 99214 OFFICE O/P EST MOD 30 MIN: CPT

## 2020-07-13 RX ORDER — KETOROLAC TROMETHAMINE 5 MG/ML
0.5 SOLUTION OPHTHALMIC
Qty: 5 | Refills: 0 | Status: ACTIVE | COMMUNITY
Start: 2020-03-11

## 2020-07-13 RX ORDER — PREDNISOLONE ACETATE 10 MG/ML
1 SUSPENSION/ DROPS OPHTHALMIC
Qty: 5 | Refills: 0 | Status: ACTIVE | COMMUNITY
Start: 2020-02-19

## 2020-07-13 RX ORDER — CYCLOBENZAPRINE HYDROCHLORIDE 5 MG/1
5 TABLET, FILM COATED ORAL 3 TIMES DAILY
Qty: 90 | Refills: 1 | Status: ACTIVE | COMMUNITY
Start: 2020-07-13 | End: 1900-01-01

## 2020-07-13 RX ORDER — GATIFLOXACIN 5 MG/ML
0.5 SOLUTION/ DROPS OPHTHALMIC
Qty: 2 | Refills: 0 | Status: ACTIVE | COMMUNITY
Start: 2020-02-19

## 2020-08-10 ENCOUNTER — APPOINTMENT (OUTPATIENT)
Dept: ORTHOPEDIC SURGERY | Facility: CLINIC | Age: 60
End: 2020-08-10
Payer: COMMERCIAL

## 2020-08-10 ENCOUNTER — TRANSCRIPTION ENCOUNTER (OUTPATIENT)
Age: 60
End: 2020-08-10

## 2020-08-10 VITALS — TEMPERATURE: 97.5 F

## 2020-08-10 DIAGNOSIS — M75.50 BURSITIS OF UNSPECIFIED SHOULDER: ICD-10-CM

## 2020-08-10 DIAGNOSIS — R20.2 PARESTHESIA OF SKIN: ICD-10-CM

## 2020-08-10 PROCEDURE — 99214 OFFICE O/P EST MOD 30 MIN: CPT

## 2020-08-11 ENCOUNTER — RX RENEWAL (OUTPATIENT)
Age: 60
End: 2020-08-11

## 2020-08-11 RX ORDER — PREGABALIN 50 MG/1
50 CAPSULE ORAL
Qty: 60 | Refills: 3 | Status: ACTIVE | COMMUNITY
Start: 2020-07-13 | End: 1900-01-01

## 2020-12-15 PROBLEM — Z87.09 HISTORY OF ACUTE BRONCHITIS: Status: RESOLVED | Noted: 2017-10-16 | Resolved: 2020-12-15

## 2021-03-08 ENCOUNTER — TRANSCRIPTION ENCOUNTER (OUTPATIENT)
Age: 61
End: 2021-03-08

## 2021-04-30 NOTE — H&P PST ADULT - CORNEAL ABRASION RISK
Pt called and scheduled to see Ira VALDEZ at 1100    Routing to Dr Pierson as a fyi   denies dry eyes

## 2022-03-25 NOTE — PATIENT PROFILE ADULT. - NS MD HP INPLANTS MED DEV
Consent: The risks of atrophy were reviewed with the patient. Concentration Of Solution Injected (Mg/Ml): 4.0 Include Z78.9 (Other Specified Conditions Influencing Health Status) As An Associated Diagnosis?: No Kenalog Preparation: Kenalog Validate Note Data When Using Inventory: Yes Detail Level: Detailed Medical Necessity Clause: This procedure was medically necessary because the lesions that were treated were: None X Size Of Lesion In Cm (Optional): 0 Administered By (Optional): Dr. Antony Total Volume Injected (Ccs- Only Use Numbers And Decimals): 3.0

## 2024-09-21 ENCOUNTER — DOCTOR'S OFFICE (OUTPATIENT)
Facility: LOCATION | Age: 64
Setting detail: OPHTHALMOLOGY
End: 2024-09-21
Payer: COMMERCIAL

## 2024-09-21 DIAGNOSIS — H11.152: ICD-10-CM

## 2024-09-21 DIAGNOSIS — H43.812: ICD-10-CM

## 2024-09-21 DIAGNOSIS — H53.2: ICD-10-CM

## 2024-09-21 DIAGNOSIS — H25.11: ICD-10-CM

## 2024-09-21 DIAGNOSIS — H26.492: ICD-10-CM

## 2024-09-21 DIAGNOSIS — H35.372: ICD-10-CM

## 2024-09-21 DIAGNOSIS — H35.40: ICD-10-CM

## 2024-09-21 DIAGNOSIS — Z96.1: ICD-10-CM

## 2024-09-21 PROCEDURE — 92134 CPTRZ OPH DX IMG PST SGM RTA: CPT | Performed by: OPHTHALMOLOGY

## 2024-09-21 PROCEDURE — 92002 INTRM OPH EXAM NEW PATIENT: CPT | Performed by: OPHTHALMOLOGY

## 2024-09-21 ASSESSMENT — LID EXAM ASSESSMENTS
OS_COMMENTS: TELANGIECTASIA
OD_COMMENTS: TELANGIECTASIA

## 2024-10-28 ENCOUNTER — DOCTOR'S OFFICE (OUTPATIENT)
Facility: LOCATION | Age: 64
Setting detail: OPHTHALMOLOGY
End: 2024-10-28
Payer: COMMERCIAL

## 2024-10-28 DIAGNOSIS — H26.492: ICD-10-CM

## 2024-10-28 PROCEDURE — 66821 AFTER CATARACT LASER SURGERY: CPT | Mod: LT | Performed by: OPHTHALMOLOGY

## 2024-10-28 ASSESSMENT — REFRACTION_CURRENTRX
OD_ADD: +2.50
OS_ADD: +2.50
OD_VPRISM_DIRECTION: PROGS
OS_OVR_VA: 20/
OD_SPHERE: -3.00
OS_VPRISM_DIRECTION: PROGS
OD_OVR_VA: 20/
OS_OVR_VA: 20/
OD_CYLINDER: +0.75
OD_AXIS: 026
OD_OVR_VA: 20/
OD_ADD: +2.50
OD_AXIS: 045
OS_CYLINDER: +0.75
OS_AXIS: 102
OD_VPRISM_DIRECTION: PROGS
OD_SPHERE: -2.50
OD_CYLINDER: +0.75
OS_SPHERE: -0.50

## 2024-10-28 ASSESSMENT — REFRACTION_MANIFEST
OS_SPHERE: -1.00
OS_AXIS: 097
OD_AXIS: 040
OD_SPHERE: -2.75
OS_SPHERE: -0.25
OD_CYLINDER: +1.00
OD_VA1: 20/20
OS_AXIS: 095
OD_CYLINDER: +1.00
OS_SPHERE: -0.75
OS_VA1: 20/20
OS_AXIS: 095
OS_VA1: 20/20
OS_CYLINDER: +1.00
OS_VA1: 20/20
OS_CYLINDER: +0.75
OD_SPHERE: -3.25
OD_AXIS: 043
OD_VA1: 20/20
OS_CYLINDER: +0.75

## 2024-10-28 ASSESSMENT — VISUAL ACUITY
OS_BCVA: 20/20-1
OD_BCVA: 20/25-

## 2024-10-28 ASSESSMENT — REFRACTION_AUTOREFRACTION
OS_SPHERE: -0.50
OD_CYLINDER: +1.25
OS_CYLINDER: +1.00
OD_SPHERE: -2.75
OD_AXIS: 032
OS_AXIS: 107

## 2024-10-28 ASSESSMENT — KERATOMETRY
OS_AXISANGLE_DEGREES: 090
OD_AXISANGLE_DEGREES: 046
OD_K2POWER_DIOPTERS: 43.50
OD_K1POWER_DIOPTERS: 42.75
OS_K2POWER_DIOPTERS: 44.00
METHOD_AUTO_MANUAL: AUTO
OS_K1POWER_DIOPTERS: 42.75

## 2024-11-11 NOTE — PATIENT PROFILE ADULT. - SOCIAL CONCERNS
Consult requested by Jayden Estrada MD for microhematuria.  PCP:  Jayden Estrada MD    Denies known Latex allergy or symptoms of Latex sensitivity.  Medications reviewed and updated.  Allergies reviewed.  Social History     Tobacco Use   Smoking Status Former    Current packs/day: 0.00    Average packs/day: 0.5 packs/day for 49.0 years (24.5 ttl pk-yrs)    Types: Cigarettes    Start date:     Quit date:     Years since quittin.8   Smokeless Tobacco Never   Tobacco Comments    Pt vapes daily, pt reported 3-4 pods per week       Prostate Specific Antigen (ng/mL)   Date Value   09/10/2024 1.93       Pain: No  Hematuria: No  Burning: No  Incontinence: No    Past urological problems/procedures: vasectomy    None